# Patient Record
Sex: MALE | Race: WHITE | NOT HISPANIC OR LATINO | Employment: FULL TIME | URBAN - METROPOLITAN AREA
[De-identification: names, ages, dates, MRNs, and addresses within clinical notes are randomized per-mention and may not be internally consistent; named-entity substitution may affect disease eponyms.]

---

## 2021-01-04 PROCEDURE — 87086 URINE CULTURE/COLONY COUNT: CPT | Performed by: FAMILY MEDICINE

## 2021-01-04 PROCEDURE — 87591 N.GONORRHOEAE DNA AMP PROB: CPT | Performed by: FAMILY MEDICINE

## 2021-01-04 PROCEDURE — 87491 CHLMYD TRACH DNA AMP PROBE: CPT | Performed by: FAMILY MEDICINE

## 2021-01-04 PROCEDURE — 87661 TRICHOMONAS VAGINALIS AMPLIF: CPT | Performed by: FAMILY MEDICINE

## 2021-01-16 ENCOUNTER — TELEPHONE (OUTPATIENT)
Dept: URGENT CARE | Facility: CLINIC | Age: 26
End: 2021-01-16

## 2021-01-16 NOTE — TELEPHONE ENCOUNTER
----- Message from Amelia Kim PA-C sent at 1/8/2021  9:14 AM EST -----  Negative screening - if continues to have issues f/u with PMD or health department  
LMTCB 1/16/2021  
Verbalized Understanding

## 2024-11-01 ENCOUNTER — OFFICE VISIT (OUTPATIENT)
Dept: ORTHOPEDIC SURGERY | Facility: CLINIC | Age: 29
End: 2024-11-01
Payer: COMMERCIAL

## 2024-11-01 VITALS — BODY MASS INDEX: 28.73 KG/M2 | WEIGHT: 194 LBS | HEIGHT: 69 IN

## 2024-11-01 DIAGNOSIS — M25.561 ACUTE PAIN OF RIGHT KNEE: Primary | ICD-10-CM

## 2024-11-01 DIAGNOSIS — S76.311A PARTIAL HAMSTRING TEAR, RIGHT, INITIAL ENCOUNTER: ICD-10-CM

## 2024-11-01 RX ORDER — ACETAMINOPHEN 160 MG
2000 TABLET,DISINTEGRATING ORAL DAILY
COMMUNITY

## 2024-11-01 NOTE — PROGRESS NOTES
"    [facility] [place] [facility] Note        Subjective     Pain of the Right Thigh      HPI    Seth Rodriguez is a 29 y.o. male.  He is a new patient.  He injured his right knee sprinting.  He felt a pop.  He felt like when he tore a biceps tendon in his elbow.    History reviewed. No pertinent past medical history.   Past Surgical History:   Procedure Laterality Date    BICEPS TENDON REPAIR        History reviewed. No pertinent family history.  Social History     Socioeconomic History    Marital status:    Tobacco Use    Smoking status: Never    Smokeless tobacco: Never   Vaping Use    Vaping status: Never Used   Substance and Sexual Activity    Alcohol use: Defer    Drug use: Defer    Sexual activity: Defer      Current Outpatient Medications on File Prior to Visit   Medication Sig Dispense Refill    Cholecalciferol (Vitamin D3) 50 MCG (2000 UT) capsule Take 1 capsule by mouth Daily.       No current facility-administered medications on file prior to visit.      No Known Allergies       Review of Systems   Constitutional: Negative.    HENT: Negative.     Eyes: Negative.    Respiratory: Negative.     Cardiovascular: Negative.    Gastrointestinal: Negative.    Endocrine: Negative.    Genitourinary: Negative.    Musculoskeletal:  Positive for arthralgias.   Skin: Negative.    Allergic/Immunologic: Negative.    Neurological: Negative.    Hematological: Negative.    Psychiatric/Behavioral: Negative.          I reviewed the patient's chief complaint, history of present illness, review of systems, past medical history, surgical history, family history, social history, medications and allergy list.        Objective      Physical Exam  Ht 175 cm (68.9\")   Wt 88 kg (194 lb 0.1 oz)   BMI 28.73 kg/m²     Body mass index is 28.73 kg/m².    General  Mental Status - alert  General Appearance - cooperative, well groomed, not in acute distress  Orientation - Oriented X3  Build & Nutrition - well developed and well " nourished  Posture - normal posture  Gait - normal gait       Ortho Exam  Right knee is tender over the posterior lateral knee.  Area of the biceps femoris attachment.  Stable to varus valgus.  Pain on full extension of the knee.    Imaging/Studies Reviewed and Interpreted:  Imaging Results (Last 24 Hours)       ** No results found for the last 24 hours. **              Assessment    Assessment:  1. Acute pain of right knee    2. Partial hamstring tear, right, initial encounter        Plan:  Continue over-the-counter medication as needed for discomfort  I have ordered an MRI of the right knee to rule out a ligament tear.  He will follow-up after the MRI.  He will start physical therapy.        Eric Gaming MD  11/01/24  10:25 EDT      Dictated Utilizing Dragon Dictation.

## 2024-11-08 ENCOUNTER — PATIENT MESSAGE (OUTPATIENT)
Dept: ORTHOPEDIC SURGERY | Facility: CLINIC | Age: 29
End: 2024-11-08

## 2024-11-12 ENCOUNTER — TELEPHONE (OUTPATIENT)
Dept: ORTHOPEDIC SURGERY | Facility: CLINIC | Age: 29
End: 2024-11-12
Payer: COMMERCIAL

## 2024-11-12 NOTE — TELEPHONE ENCOUNTER
Caller: Seth Rodriguez    Relationship: Self    Best call back number: [phone]    What orders are you requesting (i.e. lab or imaging): MRI RIGHT KNEE     In what timeframe would the patient need to come in: ASAP     Where [facility] you [facility] your [facility]imaging [facility]     Additional notes: INSURANCE DOES NOT COVER MRI THROUGH [facility], [place]

## 2024-11-15 ENCOUNTER — TELEPHONE (OUTPATIENT)
Dept: ORTHOPEDIC SURGERY | Facility: CLINIC | Age: 29
End: 2024-11-15
Payer: COMMERCIAL

## 2024-11-15 NOTE — TELEPHONE ENCOUNTER
Caller: Seth Rodriguez    Relationship: Self    Best call back number: [phone]    What orders are you requesting (i.e. lab or imaging): MRI    In what timeframe would the patient need to come in: ASAP    Where will you receive your lab/imaging services: [facility]    Additional notes: CHECKING STATUS OF AUTH PATIENT STATES HE WILL BE SELF PAYING - PLEASE LET PATIENT KNOW ONCE ORDER IS PUT IN

## 2024-11-15 NOTE — TELEPHONE ENCOUNTER
*HUB OK TO RELAY* FAXED ORDER TO [facility], THEY WILL CALL THE PATIENT TO SCHEDULE. ONCE MRI IS SCHEDULED, HUB OK TO SCHEDULE MRI F/U FOR AT LEAST THREE DAYS AFTER THE MRI DATE.

## 2024-11-25 DIAGNOSIS — S76.311A PARTIAL HAMSTRING TEAR, RIGHT, INITIAL ENCOUNTER: ICD-10-CM

## 2024-11-25 DIAGNOSIS — M25.561 ACUTE PAIN OF RIGHT KNEE: ICD-10-CM

## 2024-11-27 ENCOUNTER — OFFICE VISIT (OUTPATIENT)
Age: 29
End: 2024-11-27

## 2024-11-27 VITALS
SYSTOLIC BLOOD PRESSURE: 118 MMHG | HEIGHT: 69 IN | WEIGHT: 194 LBS | BODY MASS INDEX: 28.73 KG/M2 | DIASTOLIC BLOOD PRESSURE: 90 MMHG

## 2024-11-27 DIAGNOSIS — Z87.898 HISTORY OF GENETIC COUNSELING: ICD-10-CM

## 2024-11-27 DIAGNOSIS — S86.819A BICEPS FEMORIS TENDON STRAIN AT KNEE: Primary | ICD-10-CM

## 2024-11-27 DIAGNOSIS — Z87.898 HISTORY OF GENETIC COUNSELING: Primary | ICD-10-CM

## 2024-11-27 DIAGNOSIS — M25.561 ACUTE PAIN OF RIGHT KNEE: ICD-10-CM

## 2024-11-27 RX ORDER — IBUPROFEN 200 MG
200 TABLET ORAL EVERY 6 HOURS PRN
COMMUNITY

## 2024-11-27 NOTE — PROGRESS NOTES
"                                                                [facility] [place] [facility] [place] Up       Office Follow Up Visit       Patient Name: Seth Rodriguez    Chief Complaint:   Chief Complaint   Patient presents with    Follow-up     3.5 week follow up; Acute pain of right knee-MRI completed on 11/22/24       Referring Physician: Provider, No Known    History of Present Illness:   Seth Rodriguez returns to clinic today for ***      Subjective     Review of Systems   Constitutional: Negative.    HENT: Negative.     Eyes: Negative.    Respiratory: Negative.     Cardiovascular: Negative.    Gastrointestinal: Negative.    Endocrine: Negative.    Genitourinary: Negative.    Musculoskeletal:  Positive for arthralgias.   Skin: Negative.    Allergic/Immunologic: Negative.    Neurological: Negative.    Hematological: Negative.    Psychiatric/Behavioral: Negative.          I have reviewed and updated the following portions of the patient's history and review of systems: allergies, current medications, past family history, past medical history, past social history, past surgical history and problem list.    Medications:   Current Outpatient Medications:     Cholecalciferol (Vitamin D3) 50 MCG (2000 UT) capsule, Take 1 capsule by mouth Daily., Disp: , Rfl:     ibuprofen (ADVIL,MOTRIN) 200 MG tablet, Take 1 tablet by mouth Every 6 (Six) Hours As Needed for Mild Pain., Disp: , Rfl:     Allergies: No Known Allergies      Objective      Vital Signs:   Vitals:    11/27/24 0834   BP: 118/90   Weight: 88 kg (194 lb 0.1 oz)   Height: 175 cm (68.9\")       Ortho Exam:  ***    Results Review:  XR Knee 3 View Right  Knee X-Ray  Indication: Pain    AP, lateral and Sunrise views of right knee     Findings:  No fracture  No bony lesion  Normal soft tissues  Normal joint spaces    No prior studies were available for comparison.       No Images in the past 120 days found..      Assessment / Plan      Assessment:   There are no " "diagnoses linked to this encounter.    Quality Metrics:   BMI:   {BMI is >= 25 and <30. (Overweight) The following options were offered after discussion; (Optional):94624}       Tobacco:   Seth Rodriguez  reports that he has never smoked. He has never used smokeless tobacco. I have educated him on the risk of diseases from using tobacco products such as {Tobacco Cessation Diseases:84327::\"cancer\",\"COPD\",\"heart disease\"}.     I advised him to quit and he is {Willing/Not Willing to Quit Tobacco Products:84261}.    I spent {Time Spent Tobacco :84616} minutes counseling the patient.            Plan:  ***      Follow Up:   No follow-ups on file.        Gita Caballero MA  [facility]    Dictated using Dragon Speech Recognition.  "

## 2024-11-27 NOTE — PROGRESS NOTES
"    [facility] [place] [facility] Note        Subjective     CC: Follow-up (3.5 week follow up; Acute pain of right knee-MRI completed on 11/22/24)      RIGO Rodriguez is a 29 y.o. male.  He is follow-up his right knee sprinting injury.  He had an MRI.  He is feeling better.  He has a family history of Erler's Danlos syndrome and would like genetic testing.    Overall, patient's symptoms are improving.    ROS:    Constiutional:Pt denies fever, chills, nausea, or vomiting.  MSK:as above        Objective      Past Medical History  History reviewed. No pertinent past medical history.  Social History     Socioeconomic History    Marital status:    Tobacco Use    Smoking status: Never    Smokeless tobacco: Never   Vaping Use    Vaping status: Never Used   Substance and Sexual Activity    Alcohol use: Not Currently    Drug use: Never    Sexual activity: Defer          Physical Exam  /90   Ht 175 cm (68.9\")   Wt 88 kg (194 lb 0.1 oz)   BMI 28.73 kg/m²     Body mass index is 28.73 kg/m².    Patient is well nourished and well developed.        Ortho Exam  Right knee full motion.  Normal gait.  Minimal swelling tenderness over the biceps for Vladimir    Imaging/Labs/EMG Reviewed and Interpreted:  Imaging Results (Last 24 Hours)       ** No results found for the last 24 hours. **          I viewed in person interpreted MRI from November 22 as unremarkable with evidence of mild drainage to the biceps for Vladimir tendon    Assessment    Assessment:  1. Biceps femoris tendon strain at knee    2. Acute pain of right knee        Plan:  Recommend over the counter anti-inflammatories for pain and/or swelling  He will continue with physical therapy and will follow-up with me as needed.  His prognosis is excellent.      Eric Gaming MD  11/27/24  08:59 EST      Dictated Utilizing Dragon Dictation.  "